# Patient Record
Sex: MALE | Race: BLACK OR AFRICAN AMERICAN | NOT HISPANIC OR LATINO | Employment: OTHER | URBAN - METROPOLITAN AREA
[De-identification: names, ages, dates, MRNs, and addresses within clinical notes are randomized per-mention and may not be internally consistent; named-entity substitution may affect disease eponyms.]

---

## 2018-10-26 ENCOUNTER — HOSPITAL ENCOUNTER (EMERGENCY)
Facility: OTHER | Age: 48
Discharge: HOME | End: 2018-10-26
Attending: EMERGENCY MEDICINE
Payer: MEDICARE

## 2018-10-26 VITALS
HEART RATE: 102 BPM | WEIGHT: 310 LBS | SYSTOLIC BLOOD PRESSURE: 140 MMHG | TEMPERATURE: 100 F | DIASTOLIC BLOOD PRESSURE: 90 MMHG | OXYGEN SATURATION: 98 % | BODY MASS INDEX: 39.78 KG/M2 | RESPIRATION RATE: 22 BRPM | HEIGHT: 74 IN

## 2018-10-26 DIAGNOSIS — N20.0 KIDNEY STONES: ICD-10-CM

## 2018-10-26 DIAGNOSIS — E27.8 ADRENAL NODULE: ICD-10-CM

## 2018-10-26 DIAGNOSIS — R80.9 PROTEINURIA, UNSPECIFIED TYPE: ICD-10-CM

## 2018-10-26 DIAGNOSIS — K76.0 FATTY LIVER: ICD-10-CM

## 2018-10-26 DIAGNOSIS — R11.2 NON-INTRACTABLE VOMITING WITH NAUSEA, UNSPECIFIED VOMITING TYPE: ICD-10-CM

## 2018-10-26 DIAGNOSIS — R73.9 HYPERGLYCEMIA: ICD-10-CM

## 2018-10-26 DIAGNOSIS — R10.84 GENERALIZED ABDOMINAL PAIN: Primary | ICD-10-CM

## 2018-10-26 DIAGNOSIS — R00.0 TACHYCARDIA: ICD-10-CM

## 2018-10-26 LAB
ALBUMIN SERPL BCP-MCNC: 4 G/DL
ALLENS TEST: ABNORMAL
ALP SERPL-CCNC: 103 U/L
ALT SERPL W/O P-5'-P-CCNC: 38 U/L
ANION GAP SERPL CALC-SCNC: 13 MMOL/L
AST SERPL-CCNC: 32 U/L
B-OH-BUTYR BLD STRIP-SCNC: 0.2 MMOL/L
BACTERIA #/AREA URNS HPF: 0 /HPF
BASOPHILS # BLD AUTO: 0.02 K/UL
BASOPHILS NFR BLD: 0.2 %
BILIRUB SERPL-MCNC: 0.6 MG/DL
BILIRUB UR QL STRIP: NEGATIVE
BUN SERPL-MCNC: 13 MG/DL
CALCIUM SERPL-MCNC: 10.2 MG/DL
CAOX CRY URNS QL MICRO: ABNORMAL
CHLORIDE SERPL-SCNC: 100 MMOL/L
CLARITY UR: CLEAR
CO2 SERPL-SCNC: 25 MMOL/L
COLOR UR: YELLOW
CREAT SERPL-MCNC: 1 MG/DL
DELSYS: ABNORMAL
DIFFERENTIAL METHOD: ABNORMAL
EOSINOPHIL # BLD AUTO: 0.1 K/UL
EOSINOPHIL NFR BLD: 0.5 %
ERYTHROCYTE [DISTWIDTH] IN BLOOD BY AUTOMATED COUNT: 13 %
ERYTHROCYTE [SEDIMENTATION RATE] IN BLOOD BY WESTERGREN METHOD: 18 MM/H
EST. GFR  (AFRICAN AMERICAN): >60 ML/MIN/1.73 M^2
EST. GFR  (NON AFRICAN AMERICAN): >60 ML/MIN/1.73 M^2
FIO2: 21
FLOW: 0
GLUCOSE SERPL-MCNC: 299 MG/DL
GLUCOSE UR QL STRIP: ABNORMAL
HCO3 UR-SCNC: 26.3 MMOL/L (ref 24–28)
HCT VFR BLD AUTO: 42.2 %
HGB BLD-MCNC: 14.2 G/DL
HGB UR QL STRIP: NEGATIVE
HYALINE CASTS #/AREA URNS LPF: 0 /LPF
INR PPP: 0.9
KETONES UR QL STRIP: NEGATIVE
LACTATE SERPL-SCNC: 1.9 MMOL/L
LEUKOCYTE ESTERASE UR QL STRIP: NEGATIVE
LIPASE SERPL-CCNC: 36 U/L
LYMPHOCYTES # BLD AUTO: 0.8 K/UL
LYMPHOCYTES NFR BLD: 6.1 %
MCH RBC QN AUTO: 27 PG
MCHC RBC AUTO-ENTMCNC: 33.6 G/DL
MCV RBC AUTO: 80 FL
MICROSCOPIC COMMENT: ABNORMAL
MODE: ABNORMAL
MONOCYTES # BLD AUTO: 0.5 K/UL
MONOCYTES NFR BLD: 4.2 %
NEUTROPHILS # BLD AUTO: 11 K/UL
NEUTROPHILS NFR BLD: 88.8 %
NITRITE UR QL STRIP: NEGATIVE
PCO2 BLDA: 44 MMHG (ref 35–45)
PH SMN: 7.38 [PH] (ref 7.35–7.45)
PH UR STRIP: 6 [PH] (ref 5–8)
PLATELET # BLD AUTO: 237 K/UL
PMV BLD AUTO: 12 FL
PO2 BLDA: 33 MMHG (ref 40–60)
POC BE: 1 MMOL/L
POC SATURATED O2: 62 % (ref 95–100)
POCT GLUCOSE: 288 MG/DL (ref 70–110)
POTASSIUM SERPL-SCNC: 4.1 MMOL/L
PROT SERPL-MCNC: 8.7 G/DL
PROT UR QL STRIP: ABNORMAL
PROTHROMBIN TIME: 10.2 SEC
RBC # BLD AUTO: 5.26 M/UL
RBC #/AREA URNS HPF: 0 /HPF (ref 0–4)
SAMPLE: ABNORMAL
SITE: ABNORMAL
SODIUM SERPL-SCNC: 138 MMOL/L
SP GR UR STRIP: 1.02 (ref 1–1.03)
SP02: 97
URN SPEC COLLECT METH UR: ABNORMAL
UROBILINOGEN UR STRIP-ACNC: NEGATIVE EU/DL
WBC # BLD AUTO: 12.43 K/UL
WBC #/AREA URNS HPF: 1 /HPF (ref 0–5)
YEAST URNS QL MICRO: ABNORMAL

## 2018-10-26 PROCEDURE — 99900035 HC TECH TIME PER 15 MIN (STAT)

## 2018-10-26 PROCEDURE — 81000 URINALYSIS NONAUTO W/SCOPE: CPT

## 2018-10-26 PROCEDURE — 83690 ASSAY OF LIPASE: CPT

## 2018-10-26 PROCEDURE — 96375 TX/PRO/DX INJ NEW DRUG ADDON: CPT

## 2018-10-26 PROCEDURE — 99285 EMERGENCY DEPT VISIT HI MDM: CPT | Mod: 25

## 2018-10-26 PROCEDURE — 96365 THER/PROPH/DIAG IV INF INIT: CPT | Mod: 59

## 2018-10-26 PROCEDURE — 82803 BLOOD GASES ANY COMBINATION: CPT

## 2018-10-26 PROCEDURE — 83605 ASSAY OF LACTIC ACID: CPT

## 2018-10-26 PROCEDURE — 93005 ELECTROCARDIOGRAM TRACING: CPT

## 2018-10-26 PROCEDURE — 85610 PROTHROMBIN TIME: CPT

## 2018-10-26 PROCEDURE — 82010 KETONE BODYS QUAN: CPT

## 2018-10-26 PROCEDURE — 93010 ELECTROCARDIOGRAM REPORT: CPT | Mod: ,,, | Performed by: INTERNAL MEDICINE

## 2018-10-26 PROCEDURE — 87040 BLOOD CULTURE FOR BACTERIA: CPT | Mod: 59

## 2018-10-26 PROCEDURE — 85025 COMPLETE CBC W/AUTO DIFF WBC: CPT

## 2018-10-26 PROCEDURE — 82962 GLUCOSE BLOOD TEST: CPT

## 2018-10-26 PROCEDURE — 96361 HYDRATE IV INFUSION ADD-ON: CPT

## 2018-10-26 PROCEDURE — 63600175 PHARM REV CODE 636 W HCPCS: Performed by: EMERGENCY MEDICINE

## 2018-10-26 PROCEDURE — 80053 COMPREHEN METABOLIC PANEL: CPT

## 2018-10-26 PROCEDURE — 25500020 PHARM REV CODE 255: Performed by: EMERGENCY MEDICINE

## 2018-10-26 PROCEDURE — 25000003 PHARM REV CODE 250: Performed by: EMERGENCY MEDICINE

## 2018-10-26 RX ORDER — MORPHINE SULFATE 2 MG/ML
2 INJECTION, SOLUTION INTRAMUSCULAR; INTRAVENOUS
Status: COMPLETED | OUTPATIENT
Start: 2018-10-26 | End: 2018-10-26

## 2018-10-26 RX ORDER — HYDROCHLOROTHIAZIDE 25 MG/1
25 TABLET ORAL DAILY
COMMUNITY
End: 2022-01-28 | Stop reason: SDUPTHER

## 2018-10-26 RX ORDER — ONDANSETRON 4 MG/1
4 TABLET, ORALLY DISINTEGRATING ORAL EVERY 8 HOURS PRN
Qty: 12 TABLET | Refills: 0 | Status: SHIPPED | OUTPATIENT
Start: 2018-10-26

## 2018-10-26 RX ORDER — LISINOPRIL 40 MG/1
40 TABLET ORAL DAILY
COMMUNITY
End: 2022-01-28 | Stop reason: SDUPTHER

## 2018-10-26 RX ORDER — ACETAMINOPHEN 500 MG
1000 TABLET ORAL
Status: COMPLETED | OUTPATIENT
Start: 2018-10-26 | End: 2018-10-26

## 2018-10-26 RX ORDER — OXYCODONE AND ACETAMINOPHEN 5; 325 MG/1; MG/1
1 TABLET ORAL EVERY 6 HOURS PRN
Qty: 8 TABLET | Refills: 0 | Status: SHIPPED | OUTPATIENT
Start: 2018-10-26 | End: 2022-11-04

## 2018-10-26 RX ORDER — METFORMIN HYDROCHLORIDE 500 MG/1
500 TABLET ORAL EVERY MORNING
COMMUNITY
End: 2022-11-04 | Stop reason: SDUPTHER

## 2018-10-26 RX ORDER — INSULIN GLARGINE 100 [IU]/ML
60 INJECTION, SOLUTION SUBCUTANEOUS NIGHTLY
COMMUNITY

## 2018-10-26 RX ORDER — ONDANSETRON 2 MG/ML
4 INJECTION INTRAMUSCULAR; INTRAVENOUS
Status: COMPLETED | OUTPATIENT
Start: 2018-10-26 | End: 2018-10-26

## 2018-10-26 RX ADMIN — SODIUM CHLORIDE 3000 ML: 0.9 INJECTION, SOLUTION INTRAVENOUS at 06:10

## 2018-10-26 RX ADMIN — IOHEXOL 100 ML: 350 INJECTION, SOLUTION INTRAVENOUS at 07:10

## 2018-10-26 RX ADMIN — ACETAMINOPHEN 1000 MG: 500 TABLET ORAL at 06:10

## 2018-10-26 RX ADMIN — PROMETHAZINE HYDROCHLORIDE 12.5 MG: 25 INJECTION INTRAMUSCULAR; INTRAVENOUS at 09:10

## 2018-10-26 RX ADMIN — INSULIN HUMAN 4 UNITS: 100 INJECTION, SOLUTION PARENTERAL at 06:10

## 2018-10-26 RX ADMIN — ONDANSETRON 4 MG: 2 INJECTION INTRAMUSCULAR; INTRAVENOUS at 06:10

## 2018-10-26 RX ADMIN — MORPHINE SULFATE 2 MG: 2 INJECTION, SOLUTION INTRAMUSCULAR; INTRAVENOUS at 08:10

## 2018-10-26 NOTE — ED NOTES
Patient Identifiers for Chet Rudd checked and correct  LOC: The patient is awake, alert and aware of environment with an appropriate affect, the patient is oriented x 3 and speaking appropriate.  APPEARANCE: Patient resting comfortably and in no acute distress, patient is clean and well groomed, patient's clothing is properly fastened.  SKIN: The skin is warm and dry, patient has normal skin turgor and moist mucus membranes,no rashes or lesions.Skin Intact , No Breakdown Noted  Musculoskeletal :  Normal range of motion noted. Moves all extremeties well, No swelling or tenderness noted  RESPIRATORY: Airway is open and patent, respirations are spontaneous, patient has a normal effort and rate.Bilateral Lungs Sounds are clear  CARDIAC: Patient has a normal rate and rhythm, no periphreal edema noted, capillary refill < 3 seconds.   ABDOMEN: Soft and slight epigastric tenderness to palpation, no distention noted. Bowels Sounds are +.  Abdomen with pannus  PULSES: 2+  And symmetrical in all extremeties  NEUROLOGIC: PERRL,  facial expression is symmetrical, patient moving all extremities, normal sensation in all extremities when touched with a finger.The patient is awake, alert and cooperative with a calm affect, patient is aware of environment.    Will continue to monitor

## 2018-10-26 NOTE — ED PROVIDER NOTES
Encounter Date: 10/26/2018    SCRIBE #1 NOTE: I, Elida Page, am scribing for, and in the presence of, Dr. Edmond.       History     Chief Complaint   Patient presents with    Emesis     pt c/o n/v and weakness since this morning. pt denies fever, chills, sob, cp      Time seen by provider: 5:58 PM    This is a 48 y.o. male who presents with complaint of vomiting today. He reports upper abdominal pain began prior to N/V. He had to leave school at 11:00 AM due to pain. He denies cough, diarrhea, or constipation. He is compliant with his medications. He reports history of HTN, DM, and pancreatitis. PSHx includes cholecystectomy. He denies use of tobacco, alcohol, or illicit drugs.      The history is provided by the patient.     Review of patient's allergies indicates:  No Known Allergies  Past Medical History:   Diagnosis Date    Diabetes mellitus     Hypertension      Past Surgical History:   Procedure Laterality Date    CHOLECYSTECTOMY       History reviewed. No pertinent family history.  Social History     Tobacco Use    Smoking status: Never Smoker   Substance Use Topics    Alcohol use: No     Frequency: Never    Drug use: No     Review of Systems   Constitutional: Negative for chills and fever.   HENT: Negative for congestion and sore throat.    Eyes: Negative for photophobia and redness.   Respiratory: Negative for cough and shortness of breath.    Cardiovascular: Negative for chest pain.   Gastrointestinal: Positive for abdominal pain, nausea and vomiting. Negative for blood in stool, constipation and diarrhea.   Genitourinary: Negative for dysuria.   Musculoskeletal: Negative for back pain.   Skin: Negative for rash.   Neurological: Negative for weakness, light-headedness and headaches.   Psychiatric/Behavioral: Negative for confusion.       Physical Exam     Initial Vitals [10/26/18 1732]   BP Pulse Resp Temp SpO2   (!) 192/108 (!) 120 18 (!) 101.8 °F (38.8 °C) 98 %      MAP       --          Physical Exam    Nursing note and vitals reviewed.  Constitutional: He appears well-developed and well-nourished. He is not diaphoretic. No distress.   HENT:   Head: Normocephalic and atraumatic.   Eyes: EOM are normal. No scleral icterus.   Neck: Normal range of motion. Neck supple.   Cardiovascular: Regular rhythm and normal heart sounds. Tachycardia present.  Exam reveals no gallop and no friction rub.    No murmur heard.  Pulmonary/Chest: Breath sounds normal. No respiratory distress. He has no wheezes. He has no rhonchi. He has no rales.   Abdominal: Soft. There is generalized tenderness. There is no rebound and no guarding.   Musculoskeletal: Normal range of motion. He exhibits no edema or tenderness.   Neurological: He is alert and oriented to person, place, and time.   Skin: Skin is warm and dry.         ED Course   Procedures  Labs Reviewed   CBC W/ AUTO DIFFERENTIAL - Abnormal; Notable for the following components:       Result Value    MCV 80 (*)     Gran # (ANC) 11.0 (*)     Lymph # 0.8 (*)     Gran% 88.8 (*)     Lymph% 6.1 (*)     All other components within normal limits   COMPREHENSIVE METABOLIC PANEL - Abnormal; Notable for the following components:    Glucose 299 (*)     Total Protein 8.7 (*)     All other components within normal limits   URINALYSIS, REFLEX TO URINE CULTURE - Abnormal; Notable for the following components:    Protein, UA 2+ (*)     Glucose, UA 1+ (*)     All other components within normal limits    Narrative:     Preferred Collection Type->Urine, Clean Catch   URINALYSIS MICROSCOPIC - Abnormal; Notable for the following components:    Yeast, UA Occasional (*)     All other components within normal limits    Narrative:     Preferred Collection Type->Urine, Clean Catch   POCT GLUCOSE - Abnormal; Notable for the following components:    POCT Glucose 288 (*)     All other components within normal limits   ISTAT PROCEDURE - Abnormal; Notable for the following components:    POC PO2  33 (*)     POC SATURATED O2 62 (*)     All other components within normal limits   CULTURE, BLOOD   CULTURE, BLOOD   LACTIC ACID, PLASMA   PROTIME-INR   BETA - HYDROXYBUTYRATE, SERUM   LIPASE     EKG Readings: (Independently Interpreted)   Sinus tachycardia at rate of 117 bpm.       Imaging Results          CT Abdomen Pelvis With Contrast (Final result)  Result time 10/26/18 20:06:39    Final result by Mando Castillo MD (10/26/18 20:06:39)                 Impression:      1. Hepatomegaly and hepatic steatosis.  2. Bilateral nonobstructing nephrolithiasis, greatest in burden on the left.  3. 2.1 cm right adrenal nodule suggestive of a lipid poor adenoma.  4. Urinary bladder circumferential wall thickening without adjacent inflammation which may be related to underdistention, with cystitis not excluded.  5. Right renal few scattered subcentimeter cortical foci which are too small to characterize.  6. Proximal colonic nonspecific liquid stool which could represent a diarrheal illness, without associated bowel obstruction or definite bowel inflammation.  7. Few additional findings as above.      Electronically signed by: Mando Castillo MD  Date:    10/26/2018  Time:    20:06             Narrative:    EXAMINATION:  CT ABDOMEN PELVIS WITH CONTRAST    CLINICAL HISTORY:  Abdominal Pain;    TECHNIQUE:  Low dose axial images, sagittal and coronal reformations were obtained from the lung bases to the pubic symphysis following the IV administration of 100 mL of Omnipaque 350 .  Oral contrast was not given.  Delayed images were obtained.    COMPARISON:  Chest radiograph same day abdominal ultrasound 07/05/2012    FINDINGS:  Included lung bases show mild dependent atelectasis without large consolidation.  Base of the heart is within normal limits.    Liver is enlarged with diffuse parenchymal hypoattenuation consistent with fatty infiltration.  Small nonspecific calcification at the lateral aspect of the inferior right hepatic  lobe.  Cholecystectomy.  Pancreas, spleen, and duodenum are within normal limits.  The stomach is moderately distended with intraluminal contents without wall thickening or adjacent inflammation.  There is a 2.1 cm low-density nodule at the right adrenal gland with average 55 Hounsfield units on the arterial phase and 23 Hounsfield units on the delayed phase suggestive of a lipid poor adenoma.  Nonspecific nodular thickening of the left adrenal gland.    Bilateral kidneys are normal in size, shape and location, concentrating and excreting contrast appropriately.  2 mm nephrolith at the right renal upper pole.  At least 3 small nephroliths at the left renal lower pole with the largest measuring 4 mm.  Bilateral ureters are within normal limits.  No hydronephrosis or significant perinephric stranding.  A few scattered subcentimeter cortical foci at the right kidney which are too small to characterize.  Urinary bladder is suboptimally distended with circumferential wall thickening but no adjacent inflammation.  Prostate and seminal vesicles are within normal limits.  Pelvic phleboliths noted.    No ascites, free air or lymphadenopathy.  Aorta is within normal limits.    Tiny fat containing umbilical hernia.  Appendix and terminal ileum are within normal limits.  Liquid stool seen within the cecum and ascending colon.  No dilated loops of bowel or convincing evidence of bowel inflammation.  No pneumatosis or portal venous gas.    Included osseous structures show degenerative change most prominent at L5-S1, without acute displaced fracture-dislocation identified.                               X-Ray Chest AP Portable (Final result)  Result time 10/26/18 18:25:24    Final result by Mando Castillo MD (10/26/18 18:25:24)                 Impression:      No radiographic acute intrathoracic process seen.  Specifically, no focal consolidation.      Electronically signed by: Mando Castillo MD  Date:    10/26/2018  Time:    18:25              Narrative:    EXAMINATION:  XR CHEST AP PORTABLE    CLINICAL HISTORY:  Sepsis;    TECHNIQUE:  AP portable view of the chest was performed.    COMPARISON:  Chest radiograph 07/05/2012    FINDINGS:  Monitoring leads overlie the chest.  Patient is rotated.  Large body habitus.    Cardiomediastinal silhouette is within normal limits for age without convincing evidence of failure.  Pulmonary vasculature and hilar regions are within normal limits.  The lungs are symmetrically well expanded without large consolidation, pleural effusion or pneumothorax.  No acute osseous process seen.  PA and lateral views can be obtained.                              X-Rays:   Independently Interpreted Readings:   Chest X-Ray: No acute findings.     Medical Decision Making:   Clinical Tests:   Lab Tests: Ordered and Reviewed  Radiological Study: Ordered and Reviewed  Medical Tests: Ordered and Reviewed            Scribe Attestation:   Scribe #1: I performed the above scribed service and the documentation accurately describes the services I performed. I attest to the accuracy of the note.    Attending Attestation:           Physician Attestation for Scribe:  Physician Attestation Statement for Scribe #1: I, Dr. Edmond, reviewed documentation, as scribed by Elida Page in my presence, and it is both accurate and complete.         Attending ED Notes:   Emergent evaluation a 48-year-old male with abdominal pain, nausea and vomiting. Patient is febrile, nontoxic appearing with stable vital signs except for elevation in heart rate.  Urinary analysis reveals protein, occasional yeast and sugar with, oxalate crystals.  Patient has no elevation of white blood cell count.  H&H within normal limits. Blood glucose on CMP is 299.  No clinical or diagnostic evidence of DKA.  Lactic acid is 1.9.  Beta hydroxybutyrate is 0.2.  Lipase is 36.  CT scan reveals hepatic steatosis, bilateral nonobstructing nephrolithiasis, adrenal nodule and  liquid stool in the colon.  The patient is extensively counseled on his diagnosis and treatment including all diagnostic, laboratory and physical exam findings.  The patient discharged good condition, tolerating p.o. intake without complication and directed follow-up with his PCP in the next 24-48 hours.             Clinical Impression:     1. Generalized abdominal pain    2. Tachycardia    3. Non-intractable vomiting with nausea, unspecified vomiting type    4. Hyperglycemia    5. Proteinuria, unspecified type    6. Fatty liver    7. Kidney stones    8. Adrenal nodule                                 Obed Urrutia MD  10/26/18 1640

## 2018-10-27 NOTE — ED NOTES
Pt resting w/ eyes closed and awakens easily. Pt states the nausea medication made him sleepy. Pt denies pain and distress. No respiratory distress observed. Respirations are even and unlabored. Will continue to monitor closely.

## 2018-10-27 NOTE — ED NOTES
Pt resting w/ eyes closed and in no distress. Pt arouses easily and denies N/V/D. No respiratory distress observed. Will continue to monitor closely.

## 2018-10-27 NOTE — ED NOTES
Rec'd report from CAROL Leone RN. Pt is A & O x 3, denies SOB, fever,chills and N/V/D at this time. Respiraitons are even and unlabored. Skin is warm and dry w/ pink mucosa. VS. Pt is connected to the pulse ox, B/P cuff and EKG monitor. Bed is locked and in the low position w/ the side rails up and locked for pt safety. Call bell @ the BS. Will continue to monitor closely.

## 2018-10-31 LAB
BACTERIA BLD CULT: NORMAL
BACTERIA BLD CULT: NORMAL

## 2019-03-13 ENCOUNTER — HOSPITAL ENCOUNTER (EMERGENCY)
Facility: OTHER | Age: 49
Discharge: HOME | End: 2019-03-13
Attending: EMERGENCY MEDICINE
Payer: COMMERCIAL

## 2019-03-13 VITALS
RESPIRATION RATE: 20 BRPM | TEMPERATURE: 98 F | HEART RATE: 104 BPM | HEIGHT: 74 IN | BODY MASS INDEX: 37.47 KG/M2 | OXYGEN SATURATION: 98 % | SYSTOLIC BLOOD PRESSURE: 152 MMHG | WEIGHT: 292 LBS | DIASTOLIC BLOOD PRESSURE: 71 MMHG

## 2019-03-13 DIAGNOSIS — I10 ESSENTIAL HYPERTENSION: ICD-10-CM

## 2019-03-13 DIAGNOSIS — H11.31 SUBCONJUNCTIVAL HEMORRHAGE OF RIGHT EYE: Primary | ICD-10-CM

## 2019-03-13 PROCEDURE — 25000003 PHARM REV CODE 250: Performed by: EMERGENCY MEDICINE

## 2019-03-13 PROCEDURE — 99283 EMERGENCY DEPT VISIT LOW MDM: CPT

## 2019-03-13 RX ORDER — PROPARACAINE HYDROCHLORIDE 5 MG/ML
1 SOLUTION/ DROPS OPHTHALMIC
Status: COMPLETED | OUTPATIENT
Start: 2019-03-13 | End: 2019-03-13

## 2019-03-13 RX ADMIN — PROPARACAINE HYDROCHLORIDE 1 DROP: 5 SOLUTION/ DROPS OPHTHALMIC at 04:03

## 2019-03-13 RX ADMIN — FLUORESCEIN SODIUM 1 EACH: 1 STRIP OPHTHALMIC at 04:03

## 2019-03-13 NOTE — DISCHARGE INSTRUCTIONS
You need to have the pressure of your eyes checked as soon as possible by an eye doctor. If you vision worsens acutely, pain worsens or any other concerns you can always return to the ED if not able to be seen by an opthalmology

## 2019-03-13 NOTE — ED PROVIDER NOTES
"Encounter Date: 3/13/2019    SCRIBE #1 NOTE: I, Brina Bear, am scribing for, and in the presence of, Dr. Smith.       History     Chief Complaint   Patient presents with    Eye Problem     reports redness to right eye x3 days pta      Time seen by provider: 4:08 PM    This is a 49 Y.O. male, with a history of DM and HTN, who presents with complaint of eye redness that began three days ago. Patient reports right eye redness that has been progressively worsening. He states right eye "just feels funny." He denies trauma. He reports being near sighted. He reports chronic cough. He denies vision changes, eye pain, eye itching, eye discharge, vomiting, or headache. He denies using any treatments or medications prior to arrival. He does not have opthalmologic care established.       The history is provided by the patient.     Review of patient's allergies indicates:  No Known Allergies  Past Medical History:   Diagnosis Date    Diabetes mellitus     Hypertension      Past Surgical History:   Procedure Laterality Date    CHOLECYSTECTOMY       History reviewed. No pertinent family history.  Social History     Tobacco Use    Smoking status: Never Smoker   Substance Use Topics    Alcohol use: No     Frequency: Never    Drug use: No     Review of Systems   Constitutional: Negative for chills and fever.   HENT: Negative for sore throat.    Eyes: Positive for redness. Negative for pain, discharge, itching and visual disturbance.   Respiratory: Positive for cough. Negative for shortness of breath.    Cardiovascular: Negative for chest pain.   Gastrointestinal: Negative for abdominal pain, nausea and vomiting.   Genitourinary: Negative for dysuria.   Musculoskeletal: Negative for back pain.   Skin: Negative for rash.   Neurological: Negative for weakness and headaches.       Physical Exam     Initial Vitals [03/13/19 1530]   BP Pulse Resp Temp SpO2   (!) 151/87 110 20 98.7 °F (37.1 °C) 98 %      MAP       --     " "    Physical Exam    Nursing note and vitals reviewed.  Constitutional: He appears well-developed and well-nourished. No distress.   HENT:   Head: Normocephalic and atraumatic.   Eyes: EOM and lids are normal. Pupils are equal, round, and reactive to light. Lids are everted and swept, no foreign bodies found. Right eye exhibits no chemosis. No foreign body present in the right eye. Left eye exhibits no chemosis. No foreign body present in the left eye. Right conjunctiva has a hemorrhage.   Slit lamp exam:       The right eye shows no corneal abrasion and no fluorescein uptake.   Neck: Normal range of motion. Neck supple. No thyromegaly present. No JVD present.   Cardiovascular: Normal rate, regular rhythm and normal heart sounds.   Pulmonary/Chest: Breath sounds normal. No respiratory distress.   Abdominal: Soft. Bowel sounds are normal. He exhibits no distension and no mass. There is no tenderness.   Musculoskeletal: Normal range of motion.   Neurological: He is alert and oriented to person, place, and time. He has normal strength. No cranial nerve deficit or sensory deficit.   Skin: Skin is warm and dry. No rash noted.   Psychiatric: He has a normal mood and affect. His behavior is normal. Judgment and thought content normal.         ED Course   Procedures  Labs Reviewed - No data to display       Imaging Results    None          Medical Decision Making:   Initial Assessment:   Urgent evaluation of 49-year-old gentleman with diabetes and hypertension here with complaint of discoloration to the right eye of the last 3 days.  Patient denies known injury, foreign body sensation, reports "it just feels funny", but denies acute vision changes, or headache.  Exam w subconjunctival hem to medial sclera on R, preserved EOMI, no fluorescein uptake, IOP mildly elevated on R- 22, 27, 19, L 16, 19, 21, VA 20/70 on R, 20 /50 on L reportedly at baseline per pt  Discussed supportive care, and contact made with Optho to establish " clinic visit on 3/22, if develops vision changes, increased pain will return to ED.             Scribe Attestation:   Scribe #1: I performed the above scribed service and the documentation accurately describes the services I performed. I attest to the accuracy of the note.    Attending Attestation:           Physician Attestation for Scribe:  Physician Attestation Statement for Scribe #1: I, Dr. Smith, reviewed documentation, as scribed by Brina Bear in my presence, and it is both accurate and complete.                    Clinical Impression:     1. Subconjunctival hemorrhage of right eye    2. Essential hypertension            Disposition:   Disposition: Discharged  Condition: Mauricio Smith MD  03/13/19 1700

## 2019-03-13 NOTE — ED TRIAGE NOTES
"Pt presents to ED with c/o redness to right eye x 3 days. Pt reports 4/10 pain to right eye. Bright red discoloration noted to medial sclera. Pt denies wearing contacts. Pt reports "a little" change in vision. Denies photophobia, double vision.  "

## 2019-03-14 ENCOUNTER — TELEPHONE (OUTPATIENT)
Dept: OPHTHALMOLOGY | Facility: CLINIC | Age: 49
End: 2019-03-14

## 2019-06-24 ENCOUNTER — HOSPITAL ENCOUNTER (EMERGENCY)
Facility: OTHER | Age: 49
Discharge: HOME | End: 2019-06-24
Attending: EMERGENCY MEDICINE
Payer: COMMERCIAL

## 2019-06-24 VITALS
HEIGHT: 74 IN | BODY MASS INDEX: 37.86 KG/M2 | HEART RATE: 98 BPM | DIASTOLIC BLOOD PRESSURE: 97 MMHG | SYSTOLIC BLOOD PRESSURE: 142 MMHG | WEIGHT: 295 LBS | RESPIRATION RATE: 18 BRPM | TEMPERATURE: 98 F | OXYGEN SATURATION: 99 %

## 2019-06-24 DIAGNOSIS — J02.9 ACUTE PHARYNGITIS, UNSPECIFIED ETIOLOGY: Primary | ICD-10-CM

## 2019-06-24 LAB
DEPRECATED S PYO AG THROAT QL EIA: NEGATIVE
DEPRECATED S PYO AG THROAT QL EIA: NEGATIVE

## 2019-06-24 PROCEDURE — 25000003 PHARM REV CODE 250: Performed by: PHYSICIAN ASSISTANT

## 2019-06-24 PROCEDURE — 87147 CULTURE TYPE IMMUNOLOGIC: CPT

## 2019-06-24 PROCEDURE — 63600175 PHARM REV CODE 636 W HCPCS: Performed by: PHYSICIAN ASSISTANT

## 2019-06-24 PROCEDURE — 82962 GLUCOSE BLOOD TEST: CPT

## 2019-06-24 PROCEDURE — 87880 STREP A ASSAY W/OPTIC: CPT | Mod: 59

## 2019-06-24 PROCEDURE — 87081 CULTURE SCREEN ONLY: CPT

## 2019-06-24 PROCEDURE — 99284 EMERGENCY DEPT VISIT MOD MDM: CPT | Mod: 25

## 2019-06-24 RX ORDER — CLINDAMYCIN HYDROCHLORIDE 150 MG/1
450 CAPSULE ORAL EVERY 6 HOURS
Qty: 120 CAPSULE | Refills: 0 | Status: SHIPPED | OUTPATIENT
Start: 2019-06-24 | End: 2019-07-04

## 2019-06-24 RX ORDER — IBUPROFEN 600 MG/1
600 TABLET ORAL EVERY 6 HOURS PRN
Qty: 20 TABLET | Refills: 0 | Status: SHIPPED | OUTPATIENT
Start: 2019-06-24

## 2019-06-24 RX ORDER — KETOROLAC TROMETHAMINE 30 MG/ML
30 INJECTION, SOLUTION INTRAMUSCULAR; INTRAVENOUS
Status: COMPLETED | OUTPATIENT
Start: 2019-06-24 | End: 2019-06-24

## 2019-06-24 RX ORDER — ACETAMINOPHEN 650 MG/20.3ML
1000 LIQUID ORAL
Status: COMPLETED | OUTPATIENT
Start: 2019-06-24 | End: 2019-06-24

## 2019-06-24 RX ORDER — CLINDAMYCIN HYDROCHLORIDE 150 MG/1
450 CAPSULE ORAL
Status: COMPLETED | OUTPATIENT
Start: 2019-06-24 | End: 2019-06-24

## 2019-06-24 RX ORDER — DEXAMETHASONE SODIUM PHOSPHATE 4 MG/ML
8 INJECTION, SOLUTION INTRA-ARTICULAR; INTRALESIONAL; INTRAMUSCULAR; INTRAVENOUS; SOFT TISSUE
Status: COMPLETED | OUTPATIENT
Start: 2019-06-24 | End: 2019-06-24

## 2019-06-24 RX ADMIN — CLINDAMYCIN HYDROCHLORIDE 450 MG: 150 CAPSULE ORAL at 02:06

## 2019-06-24 RX ADMIN — ACETAMINOPHEN 999.01 MG: 160 SOLUTION ORAL at 01:06

## 2019-06-24 RX ADMIN — KETOROLAC TROMETHAMINE 30 MG: 30 INJECTION, SOLUTION INTRAMUSCULAR; INTRAVENOUS at 01:06

## 2019-06-24 RX ADMIN — DEXAMETHASONE SODIUM PHOSPHATE 8 MG: 4 INJECTION, SOLUTION INTRAMUSCULAR; INTRAVENOUS at 01:06

## 2019-06-24 NOTE — ED TRIAGE NOTES
"Pt arrived with c/o sore throat x 3 days.  Pt states, "It hurts to swallow and my throat feels swollen."  Pt denies any difficultly breathing.  Reports his voice is hoarse.  Reports R ear pain.  Denies fever.  Respirations even and unlabored.  "

## 2019-06-24 NOTE — ED PROVIDER NOTES
Encounter Date: 6/24/2019       History     Chief Complaint   Patient presents with    Sore Throat     Pt sore throat for the past three days.      Patient is 49-year-old male history of hypertension diabetes who presents with complaints of sore throat that has been present for 2 days prior to arrival.  He reports throat is painful when swallowing eating drinking and speaking.  He is able to swallow solid foods.  He is able to swallow saliva.  He has no difficulty breathing.  He reports right side is more painful than the left.  He denies fevers, chills, nausea or vomiting.  He has no associated nasal congestion, coughing, chest pain the or shortness of breath.  He has not been taking any medications to help with the symptoms but he does report that he has been gargling with salt water and drinking warm tea without any relief.  He has no known allergies and is currently unaccompanied in the ER.        Review of patient's allergies indicates:  No Known Allergies  Past Medical History:   Diagnosis Date    Diabetes mellitus     Hypertension      Past Surgical History:   Procedure Laterality Date    CHOLECYSTECTOMY       History reviewed. No pertinent family history.  Social History     Tobacco Use    Smoking status: Current Every Day Smoker     Packs/day: 0.50     Types: Cigarettes   Substance Use Topics    Alcohol use: No     Frequency: Never    Drug use: No     Review of Systems   Constitutional: Negative for fever.   HENT: Positive for sore throat.    Respiratory: Negative for shortness of breath.    Cardiovascular: Negative for chest pain.   Gastrointestinal: Negative for nausea.   Genitourinary: Negative for dysuria.   Musculoskeletal: Negative for back pain.   Skin: Negative for rash.   Neurological: Negative for weakness.   Hematological: Does not bruise/bleed easily.       Physical Exam     Initial Vitals [06/24/19 1239]   BP Pulse Resp Temp SpO2   (!) 171/99 109 18 99.2 °F (37.3 °C) 100 %      MAP        --         Physical Exam    Nursing note and vitals reviewed.  Constitutional: He appears well-developed and well-nourished. He is not diaphoretic. No distress.   Healthy-appearing male in no acute distress or apparent pain. Makes good eye contact, speaks in clear full sentences though has hot potato voice.  Manages oral secretions with ease.  Is cooperative and ambulates on his own.   HENT:   Head: Normocephalic and atraumatic.   Posterior oropharynx is erythematous edematous with exudate on bilateral tonsils no obvious uvula deviation or asymmetry.   Eyes: Conjunctivae and EOM are normal. Pupils are equal, round, and reactive to light. Right eye exhibits no discharge. Left eye exhibits no discharge. No scleral icterus.   Neck: Normal range of motion.   Cardiovascular: Normal rate, regular rhythm, normal heart sounds and intact distal pulses. Exam reveals no gallop and no friction rub.    No murmur heard.  Pulmonary/Chest: Breath sounds normal. He has no wheezes. He has no rhonchi. He has no rales.   Clear lungs auscultation bilaterally   Abdominal: Soft. Bowel sounds are normal. There is no tenderness. There is no rebound and no guarding.   Musculoskeletal: Normal range of motion. He exhibits no edema or tenderness.   Lymphadenopathy:     He has no cervical adenopathy.   Neurological: He is alert and oriented to person, place, and time. He has normal strength. No cranial nerve deficit or sensory deficit. GCS score is 15. GCS eye subscore is 4. GCS verbal subscore is 5. GCS motor subscore is 6.   Skin: Skin is warm. Capillary refill takes less than 2 seconds. No rash and no abscess noted. No erythema.   Psychiatric: He has a normal mood and affect. His behavior is normal. Thought content normal.         ED Course   Procedures  Labs Reviewed   THROAT SCREEN, RAPID          Imaging Results    None          Medical Decision Making:   ED Management:  Urgent evaluation a 49-year-old male who presents with complaints  most consistent with acute pharyngitis etiology unclear at this time.  He is afebrile, nontoxic appearing, hemodynamically stable though has low-grade temp 99.2° with heart rate of 109.  Suspect he could be spiking a fever.  Will obtain rapid strep and give Toradol Decadron Tylenol for symptom management.  Depending on culture results will either treat with Bicillin or clindamycin.  Patient is amenable to plan.  Will continue to follow.    Update: Patient feeling much better, HR still slightly elevated, will give 1L water to drink for oral hydration. He has no clinical evidence of PTA at  This time but because of his swelling and report of right sided pain greater than left with cover with PO clindamycin out of an abundance of caution. Rapid strep is negative. He is educated very carefully on ED return precautions and verbalizes understanding. He is stable for discharge.                        Clinical Impression:       ICD-10-CM ICD-9-CM   1. Acute pharyngitis, unspecified etiology J02.9 462                                Shavonne Bear PA-C  06/24/19 0422

## 2019-06-26 LAB — BACTERIA THROAT CULT: NORMAL

## 2020-10-28 ENCOUNTER — HOSPITAL ENCOUNTER (EMERGENCY)
Facility: OTHER | Age: 50
Discharge: HOME | End: 2020-10-28
Attending: EMERGENCY MEDICINE
Payer: MEDICARE

## 2020-10-28 VITALS
WEIGHT: 280 LBS | SYSTOLIC BLOOD PRESSURE: 140 MMHG | TEMPERATURE: 99 F | DIASTOLIC BLOOD PRESSURE: 91 MMHG | RESPIRATION RATE: 18 BRPM | OXYGEN SATURATION: 95 % | HEIGHT: 74 IN | BODY MASS INDEX: 35.94 KG/M2 | HEART RATE: 96 BPM

## 2020-10-28 DIAGNOSIS — R11.2 NAUSEA AND VOMITING IN ADULT: ICD-10-CM

## 2020-10-28 DIAGNOSIS — R10.84 GENERALIZED ABDOMINAL PAIN: Primary | ICD-10-CM

## 2020-10-28 LAB
ALBUMIN SERPL BCP-MCNC: 4.3 G/DL (ref 3.5–5.2)
ALP SERPL-CCNC: 99 U/L (ref 55–135)
ALT SERPL W/O P-5'-P-CCNC: 26 U/L (ref 10–44)
ANION GAP SERPL CALC-SCNC: 13 MMOL/L (ref 8–16)
AST SERPL-CCNC: 16 U/L (ref 10–40)
BASOPHILS # BLD AUTO: 0.07 K/UL (ref 0–0.2)
BASOPHILS NFR BLD: 0.5 % (ref 0–1.9)
BILIRUB SERPL-MCNC: 0.5 MG/DL (ref 0.1–1)
BILIRUB UR QL STRIP: NEGATIVE
BUN SERPL-MCNC: 14 MG/DL (ref 6–20)
CALCIUM SERPL-MCNC: 10.1 MG/DL (ref 8.7–10.5)
CHLORIDE SERPL-SCNC: 97 MMOL/L (ref 95–110)
CLARITY UR: CLEAR
CO2 SERPL-SCNC: 27 MMOL/L (ref 23–29)
COLOR UR: YELLOW
CREAT SERPL-MCNC: 1.2 MG/DL (ref 0.5–1.4)
DIFFERENTIAL METHOD: ABNORMAL
EOSINOPHIL # BLD AUTO: 0.1 K/UL (ref 0–0.5)
EOSINOPHIL NFR BLD: 0.3 % (ref 0–8)
ERYTHROCYTE [DISTWIDTH] IN BLOOD BY AUTOMATED COUNT: 12.7 % (ref 11.5–14.5)
EST. GFR  (AFRICAN AMERICAN): >60 ML/MIN/1.73 M^2
EST. GFR  (NON AFRICAN AMERICAN): >60 ML/MIN/1.73 M^2
GLUCOSE SERPL-MCNC: 265 MG/DL (ref 70–110)
GLUCOSE UR QL STRIP: ABNORMAL
HCT VFR BLD AUTO: 42.9 % (ref 40–54)
HGB BLD-MCNC: 14 G/DL (ref 14–18)
HGB UR QL STRIP: NEGATIVE
IMM GRANULOCYTES # BLD AUTO: 0.04 K/UL (ref 0–0.04)
IMM GRANULOCYTES NFR BLD AUTO: 0.3 % (ref 0–0.5)
KETONES UR QL STRIP: NEGATIVE
LEUKOCYTE ESTERASE UR QL STRIP: NEGATIVE
LIPASE SERPL-CCNC: 46 U/L (ref 4–60)
LYMPHOCYTES # BLD AUTO: 1.9 K/UL (ref 1–4.8)
LYMPHOCYTES NFR BLD: 12.7 % (ref 18–48)
MCH RBC QN AUTO: 26.7 PG (ref 27–31)
MCHC RBC AUTO-ENTMCNC: 32.6 G/DL (ref 32–36)
MCV RBC AUTO: 82 FL (ref 82–98)
MONOCYTES # BLD AUTO: 0.6 K/UL (ref 0.3–1)
MONOCYTES NFR BLD: 4 % (ref 4–15)
NEUTROPHILS # BLD AUTO: 12.4 K/UL (ref 1.8–7.7)
NEUTROPHILS NFR BLD: 82.2 % (ref 38–73)
NITRITE UR QL STRIP: NEGATIVE
NRBC BLD-RTO: 0 /100 WBC
PH UR STRIP: 5 [PH] (ref 5–8)
PLATELET # BLD AUTO: 260 K/UL (ref 150–350)
PMV BLD AUTO: 11.2 FL (ref 9.2–12.9)
POCT GLUCOSE: 326 MG/DL (ref 70–110)
POTASSIUM SERPL-SCNC: 3.9 MMOL/L (ref 3.5–5.1)
PROT SERPL-MCNC: 8.9 G/DL (ref 6–8.4)
PROT UR QL STRIP: ABNORMAL
RBC # BLD AUTO: 5.25 M/UL (ref 4.6–6.2)
SODIUM SERPL-SCNC: 137 MMOL/L (ref 136–145)
SP GR UR STRIP: >=1.03 (ref 1–1.03)
URN SPEC COLLECT METH UR: ABNORMAL
UROBILINOGEN UR STRIP-ACNC: NEGATIVE EU/DL
WBC # BLD AUTO: 15.07 K/UL (ref 3.9–12.7)

## 2020-10-28 PROCEDURE — 80053 COMPREHEN METABOLIC PANEL: CPT

## 2020-10-28 PROCEDURE — 85025 COMPLETE CBC W/AUTO DIFF WBC: CPT

## 2020-10-28 PROCEDURE — 99283 EMERGENCY DEPT VISIT LOW MDM: CPT | Mod: 25

## 2020-10-28 PROCEDURE — 81003 URINALYSIS AUTO W/O SCOPE: CPT

## 2020-10-28 PROCEDURE — 82962 GLUCOSE BLOOD TEST: CPT

## 2020-10-28 PROCEDURE — 83690 ASSAY OF LIPASE: CPT

## 2020-10-28 PROCEDURE — 25000003 PHARM REV CODE 250: Performed by: EMERGENCY MEDICINE

## 2020-10-28 RX ORDER — ONDANSETRON 8 MG/1
8 TABLET, ORALLY DISINTEGRATING ORAL
Status: COMPLETED | OUTPATIENT
Start: 2020-10-28 | End: 2020-10-28

## 2020-10-28 RX ORDER — FAMOTIDINE 20 MG/1
20 TABLET, FILM COATED ORAL
Status: COMPLETED | OUTPATIENT
Start: 2020-10-28 | End: 2020-10-28

## 2020-10-28 RX ORDER — ONDANSETRON 8 MG/1
8 TABLET, ORALLY DISINTEGRATING ORAL EVERY 6 HOURS PRN
Qty: 15 TABLET | Refills: 0 | Status: SHIPPED | OUTPATIENT
Start: 2020-10-28

## 2020-10-28 RX ADMIN — FAMOTIDINE 20 MG: 20 TABLET ORAL at 06:10

## 2020-10-28 RX ADMIN — ONDANSETRON 8 MG: 8 TABLET, ORALLY DISINTEGRATING ORAL at 04:10

## 2020-10-28 NOTE — ED PROVIDER NOTES
"Encounter Date: 10/28/2020    SCRIBE #1 NOTE: IBre, am scribing for, and in the presence of, Dr. Frank.       History     Chief Complaint   Patient presents with    Abdominal Pain     generalized left sided x1 week- states "can't keep anything down". Pt diaphoretic     Time seen by provider: 3:45 AM    This is a 50 y.o. male, with a hx of diabetes and HTN, who presents with complaint of generalized abdominal pain for the last week. Pt states the pain has gotten progressively worse. He states he has been unable to tolerate any liquids or solids. Pt reports body aches, chills, and dysuria. He denies hematuria or fever.    The history is provided by the patient and medical records.     Review of patient's allergies indicates:  No Known Allergies  Past Medical History:   Diagnosis Date    Diabetes mellitus     Hypertension      Past Surgical History:   Procedure Laterality Date    CHOLECYSTECTOMY       History reviewed. No pertinent family history.  Social History     Tobacco Use    Smoking status: Current Every Day Smoker     Packs/day: 0.50     Types: Cigarettes   Substance Use Topics    Alcohol use: No     Frequency: Never    Drug use: Yes     Comment: opoids     Review of Systems   Constitutional: Positive for chills. Negative for fever.   HENT: Negative for congestion, rhinorrhea and sore throat.    Eyes: Negative for visual disturbance.   Respiratory: Negative for cough and shortness of breath.    Cardiovascular: Negative for chest pain.   Gastrointestinal: Positive for abdominal pain, nausea and vomiting. Negative for diarrhea.   Genitourinary: Positive for dysuria. Negative for hematuria.   Musculoskeletal: Positive for myalgias. Negative for back pain.   Skin: Negative for rash.   Neurological: Negative for dizziness, weakness and light-headedness.   Psychiatric/Behavioral: Negative for confusion.       Physical Exam     Initial Vitals [10/28/20 0051]   BP Pulse Resp Temp SpO2   (!) 138/93 " 101 18 98.7 °F (37.1 °C) 99 %      MAP       --         Physical Exam    Nursing note and vitals reviewed.  Constitutional: He appears well-developed and well-nourished. He is not diaphoretic. No distress.   HENT:   Head: Normocephalic and atraumatic.   Eyes: Conjunctivae and EOM are normal. Pupils are equal, round, and reactive to light. No scleral icterus.   Neck: Normal range of motion. Neck supple.   Cardiovascular: Normal rate, regular rhythm and normal heart sounds. Exam reveals no gallop and no friction rub.    No murmur heard.  Pulmonary/Chest: Breath sounds normal. No respiratory distress. He has no wheezes. He has no rhonchi. He has no rales.   Abdominal: Soft. Bowel sounds are normal. He exhibits no distension. There is generalized abdominal tenderness. There is no rebound and no guarding.   Musculoskeletal: Normal range of motion. No tenderness or edema.   Neurological: He is alert and oriented to person, place, and time.   Skin: Skin is warm and dry.         ED Course   Procedures  Labs Reviewed   CBC W/ AUTO DIFFERENTIAL - Abnormal; Notable for the following components:       Result Value    WBC 15.07 (*)     MCH 26.7 (*)     Gran # (ANC) 12.4 (*)     Gran % 82.2 (*)     Lymph % 12.7 (*)     All other components within normal limits   COMPREHENSIVE METABOLIC PANEL - Abnormal; Notable for the following components:    Glucose 265 (*)     Total Protein 8.9 (*)     All other components within normal limits   URINALYSIS, REFLEX TO URINE CULTURE - Abnormal; Notable for the following components:    Specific Gravity, UA >=1.030 (*)     Protein, UA Trace (*)     Glucose, UA 2+ (*)     All other components within normal limits    Narrative:     Specimen Source->Urine   POCT GLUCOSE - Abnormal; Notable for the following components:    POCT Glucose 326 (*)     All other components within normal limits   LIPASE          Imaging Results    None          Medical Decision Making:   History:   Old Medical Records: I  decided to obtain old medical records.  Clinical Tests:   Lab Tests: Ordered and Reviewed            Scribe Attestation:   Scribe #1: I performed the above scribed service and the documentation accurately describes the services I performed. I attest to the accuracy of the note.    Attending Attestation:           Physician Attestation for Scribe:  Physician Attestation Statement for Scribe #1: I, Dr. Frank, reviewed documentation, as scribed by Bre Andres in my presence, and it is both accurate and complete.                 ED Course as of Oct 28 2207   Wed Oct 28, 2020   0548 5:48 AM  Patient presented complaining of generalized abdominal pain with nausea and vomiting for 3-4 days.  On my assessment he had diffuse abdominal tenderness to palpation.  Despite reporting not being evaluated for the same complaint previously, I did find he had an ED visit 2 days ago for the same symptoms.  At that time he had a workup which included blood work and urinalysis.  Labs were without significant abnormalities except for an elevated blood glucose.  I did repeat labs today which again were significant for an elevated blood glucose.  He also had a leukocytosis with a slight left shift.  My reassessment the patient is sleeping comfortably.  He has required no pain medication and only received Zofran ODT.  Etiology of the leukocytosis is unclear, however, I do not feel a CT is necessary at this time.  Patient was given precautions for seeking immediate re-evaluation and will be discharged home with a prescription for Zofran and instructions to follow up with primary care the next 72 hr.    [AA]      ED Course User Index  [AA] Melina Frank MD            Clinical Impression:     ICD-10-CM ICD-9-CM   1. Generalized abdominal pain  R10.84 789.07   2. Nausea and vomiting in adult  R11.2 787.01                          ED Disposition Condition    Discharge Stable        ED Prescriptions     Medication Sig Dispense Start Date End  Date Auth. Provider    ondansetron (ZOFRAN-ODT) 8 MG TbDL Take 1 tablet (8 mg total) by mouth every 6 (six) hours as needed (Nausea). 15 tablet 10/28/2020  Melina Frank MD        Follow-up Information     Follow up With Specialties Details Why Contact Info    [facility] Emergency Medicine Go to  If symptoms worsen [address]  [facility] [id number] [phone]    Your primary care doctor  Schedule an appointment as soon as possible for a visit on 10/30/2020 For re-evaluation                                        Melina Frank MD  10/28/20 6393

## 2020-10-28 NOTE — ED TRIAGE NOTES
"Pt reports to ED with c/o generalized, L-sided, intermittent ABD pain x5 days. Pt reports N,V,D. Pt states he cannot has no appetite and cannot keep anything down. Pt diaphoretic. Pt describes pain as "pulling." Pt denies taking medication at home. Pt has hx of diabetes and HTN and reports medication compliance.    "

## 2022-01-28 ENCOUNTER — HOSPITAL ENCOUNTER (EMERGENCY)
Facility: HOSPITAL | Age: 52
Discharge: HOME | End: 2022-01-28
Attending: EMERGENCY MEDICINE
Payer: MEDICARE

## 2022-01-28 VITALS
BODY MASS INDEX: 33.86 KG/M2 | HEIGHT: 72 IN | WEIGHT: 250 LBS | HEART RATE: 89 BPM | SYSTOLIC BLOOD PRESSURE: 192 MMHG | RESPIRATION RATE: 16 BRPM | DIASTOLIC BLOOD PRESSURE: 109 MMHG | TEMPERATURE: 98 F | OXYGEN SATURATION: 100 %

## 2022-01-28 DIAGNOSIS — F11.20 UNCOMPLICATED OPIOID DEPENDENCE: ICD-10-CM

## 2022-01-28 DIAGNOSIS — I10 HYPERTENSION, UNSPECIFIED TYPE: Primary | ICD-10-CM

## 2022-01-28 PROCEDURE — 99284 EMERGENCY DEPT VISIT MOD MDM: CPT

## 2022-01-28 PROCEDURE — 99284 PR EMERGENCY DEPT VISIT,LEVEL IV: ICD-10-PCS | Mod: ,,, | Performed by: EMERGENCY MEDICINE

## 2022-01-28 PROCEDURE — 25000003 PHARM REV CODE 250: Performed by: EMERGENCY MEDICINE

## 2022-01-28 PROCEDURE — 99284 EMERGENCY DEPT VISIT MOD MDM: CPT | Mod: ,,, | Performed by: EMERGENCY MEDICINE

## 2022-01-28 RX ORDER — METOPROLOL SUCCINATE 50 MG/1
50 TABLET, EXTENDED RELEASE ORAL DAILY
Qty: 30 TABLET | Refills: 0 | Status: SHIPPED | OUTPATIENT
Start: 2022-01-28 | End: 2022-11-04 | Stop reason: ALTCHOICE

## 2022-01-28 RX ORDER — HYDROCHLOROTHIAZIDE 25 MG/1
25 TABLET ORAL
Status: COMPLETED | OUTPATIENT
Start: 2022-01-28 | End: 2022-01-28

## 2022-01-28 RX ORDER — HYDROCHLOROTHIAZIDE 25 MG/1
25 TABLET ORAL DAILY
Qty: 30 TABLET | Refills: 0 | Status: SHIPPED | OUTPATIENT
Start: 2022-01-28 | End: 2022-11-04

## 2022-01-28 RX ORDER — METOPROLOL SUCCINATE 50 MG/1
50 TABLET, EXTENDED RELEASE ORAL DAILY
COMMUNITY
End: 2022-01-28 | Stop reason: SDUPTHER

## 2022-01-28 RX ORDER — LISINOPRIL 40 MG/1
40 TABLET ORAL DAILY
Qty: 30 TABLET | Refills: 0 | Status: SHIPPED | OUTPATIENT
Start: 2022-01-28 | End: 2022-11-04 | Stop reason: SDUPTHER

## 2022-01-28 RX ORDER — METOPROLOL SUCCINATE 50 MG/1
50 TABLET, EXTENDED RELEASE ORAL DAILY
Status: DISCONTINUED | OUTPATIENT
Start: 2022-01-28 | End: 2022-01-28 | Stop reason: HOSPADM

## 2022-01-28 RX ORDER — LISINOPRIL 20 MG/1
40 TABLET ORAL
Status: COMPLETED | OUTPATIENT
Start: 2022-01-28 | End: 2022-01-28

## 2022-01-28 RX ORDER — AMLODIPINE BESYLATE 10 MG/1
10 TABLET ORAL DAILY
Qty: 30 TABLET | Refills: 0 | Status: SHIPPED | OUTPATIENT
Start: 2022-01-28 | End: 2022-11-04 | Stop reason: SDUPTHER

## 2022-01-28 RX ADMIN — HYDROCHLOROTHIAZIDE 25 MG: 25 TABLET ORAL at 04:01

## 2022-01-28 RX ADMIN — LISINOPRIL 40 MG: 20 TABLET ORAL at 04:01

## 2022-01-28 RX ADMIN — METOPROLOL SUCCINATE 50 MG: 50 TABLET, EXTENDED RELEASE ORAL at 04:01

## 2022-01-28 NOTE — ED PROVIDER NOTES
Encounter Date: 1/28/2022       History     Chief Complaint   Patient presents with    Hypertension     Was seeking voluntary admission at [facility] for drug detox, and HTN was noted--hx HTN, noncompliant with meds, denies any complaints     Patient is a 51-year-old male past medical history of hypertension, diabetes, opiate dependence presenting today for hypertension.  He has been noncompliant with his blood pressure medications for the past week.  States that he ran out of his medications at home.  He has also been use in heroin daily, last snorted prior to arrival at [facility].  He went for voluntary detox but was referred to the ED for severe hypertension.  He denies headache, vision change, unilateral weakness, numbness/tingling.  No chest pain or shortness of breath.          Review of patient's allergies indicates:  No Known Allergies  Past Medical History:   Diagnosis Date    Diabetes mellitus     Hypertension      Past Surgical History:   Procedure Laterality Date    CHOLECYSTECTOMY       History reviewed. No pertinent family history.  Social History     Tobacco Use    Smoking status: Current Every Day Smoker     Packs/day: 0.50     Types: Cigarettes    Smokeless tobacco: Never Used   Substance Use Topics    Alcohol use: No    Drug use: Yes     Comment: heroin and Fentanyl mixed, last use 2 hours PTA     Review of Systems   Constitutional: Negative for fever.   HENT: Negative for sore throat.    Respiratory: Negative for shortness of breath.    Cardiovascular: Negative for chest pain.   Gastrointestinal: Negative for nausea.   Genitourinary: Negative for dysuria.   Musculoskeletal: Negative for back pain.   Skin: Negative for rash.   Neurological: Negative for weakness.   Hematological: Does not bruise/bleed easily.   Psychiatric/Behavioral: Negative for self-injury and suicidal ideas.        Opiate dependence       Physical Exam     Initial Vitals [01/28/22 1529]   BP Pulse Resp Temp SpO2   (!)  181/99 77 16 97.5 °F (36.4 °C) 99 %      MAP       --         Physical Exam    Nursing note and vitals reviewed.  Constitutional: He appears well-developed and well-nourished. No distress.   HENT:   Mouth/Throat: Oropharynx is clear and moist.   Eyes: Conjunctivae are normal.   Neck: Neck supple.   Cardiovascular: Normal rate, regular rhythm and intact distal pulses.   Pulmonary/Chest: Breath sounds normal. He has no wheezes. He has no rales.   Abdominal: Abdomen is soft. He exhibits no distension. There is no abdominal tenderness. There is no rebound and no guarding.   Musculoskeletal:         General: No edema.      Cervical back: Neck supple.     Lymphadenopathy:     He has no cervical adenopathy.   Neurological: He is alert and oriented to person, place, and time.   Skin: Skin is warm. Capillary refill takes less than 2 seconds. No rash noted.   Psychiatric: He has a normal mood and affect.         ED Course   Procedures  Labs Reviewed   HIV 1 / 2 ANTIBODY   HEPATITIS C ANTIBODY          Imaging Results    None          Medications   metoprolol succinate (TOPROL-XL) 24 hr tablet 50 mg (50 mg Oral Given 1/28/22 1646)   lisinopriL tablet 40 mg (40 mg Oral Given 1/28/22 1646)   hydroCHLOROthiazide tablet 25 mg (25 mg Oral Given 1/28/22 1646)     Medical Decision Making:   History:   Old Medical Records: I decided to obtain old medical records.  Initial Assessment:   Emergent evaluation is 51-year-old male presenting today for uncontrolled high blood pressure.  Reports noncompliant with his medications.  On arrival, /99 otherwise vital signs are stable.  He denies any complaints at this time.  Differential Diagnosis:   Asymptomatic hypertension, opiate dependence  ED Management:  - home BP meds ordered.  Patient requesting to eat.  Order placed.  Will monitor BP.    - nurse contacted [facility], states that they will be able to take patient back once his blood pressure is addressed.  He does not need medical  clearance labs as he is a voluntary admission.                       Clinical Impression:   Final diagnoses:  [I10] Hypertension, unspecified type (Primary)  [F11.20] Uncomplicated opioid dependence                 Gena Morales MD  01/28/22 2003

## 2022-01-28 NOTE — ED NOTES
Spoke with [facility], requires patient to have b/p meds and rx, are holding his bed for him,not admitted yet

## 2022-01-28 NOTE — ED NOTES
Patient identifiers verified and correct for Mr Higuera  C/C: Elevated b/p SEE NN  APPEARANCE: awake and alert in NAD.  SKIN: warm, dry and intact. No breakdown or bruising.  MUSCULOSKELETAL: Patient moving all extremities spontaneously, no obvious swelling or deformities noted. Ambulates independently.  RESPIRATORY: Denies shortness of breath.Respirations unlabored.   CARDIAC: Denies CP, 2+ distal pulses; no peripheral edema  ABDOMEN: S/ND/NT, Denies nausea  : voids spontaneously, denies difficulty  Neurologic: AAO x 4; follows commands equal strength in all extremities; denies numbness/tingling. Denies dizziness denies weakness, deneis headache

## 2022-01-28 NOTE — ED NOTES
Patient to be admitted to [facility] for detox, states his b/p is elevated, supposed to be on meds x4, none x 1 week. Heroin and Fentanyl mix, last use 2 hours PTA

## 2022-01-28 NOTE — DISCHARGE INSTRUCTIONS
It is important that you take your blood pressure keep medications daily  Restart all medication  Return to [facility] for detox

## 2022-01-29 NOTE — ED NOTES
Patient states he would like to be discharged with LYTRENT to [facility], mult attempts ot contact family, physician aware of current b/p. Await rx to be signed for discharge

## 2022-01-30 ENCOUNTER — HOSPITAL ENCOUNTER (EMERGENCY)
Facility: OTHER | Age: 52
Discharge: LEFT BEFORE DISCHARGE | End: 2022-01-30
Attending: EMERGENCY MEDICINE
Payer: MEDICARE

## 2022-01-30 VITALS
OXYGEN SATURATION: 100 % | TEMPERATURE: 99 F | DIASTOLIC BLOOD PRESSURE: 110 MMHG | HEART RATE: 88 BPM | RESPIRATION RATE: 19 BRPM | SYSTOLIC BLOOD PRESSURE: 200 MMHG

## 2022-01-30 DIAGNOSIS — F11.93 OPIATE WITHDRAWAL: ICD-10-CM

## 2022-01-30 DIAGNOSIS — I10 UNCONTROLLED HYPERTENSION: Primary | ICD-10-CM

## 2022-01-30 DIAGNOSIS — R11.2 NAUSEA AND VOMITING, INTRACTABILITY OF VOMITING NOT SPECIFIED, UNSPECIFIED VOMITING TYPE: ICD-10-CM

## 2022-01-30 LAB
ALBUMIN SERPL BCP-MCNC: 3.9 G/DL (ref 3.5–5.2)
ALP SERPL-CCNC: 98 U/L (ref 55–135)
ALT SERPL W/O P-5'-P-CCNC: 26 U/L (ref 10–44)
ANION GAP SERPL CALC-SCNC: 16 MMOL/L (ref 8–16)
AST SERPL-CCNC: 27 U/L (ref 10–40)
BACTERIA #/AREA URNS HPF: ABNORMAL /HPF
BASOPHILS # BLD AUTO: 0.03 K/UL (ref 0–0.2)
BASOPHILS NFR BLD: 0.3 % (ref 0–1.9)
BILIRUB SERPL-MCNC: 0.6 MG/DL (ref 0.1–1)
BILIRUB UR QL STRIP: NEGATIVE
BUN SERPL-MCNC: 13 MG/DL (ref 6–20)
CALCIUM SERPL-MCNC: 10.2 MG/DL (ref 8.7–10.5)
CHLORIDE SERPL-SCNC: 99 MMOL/L (ref 95–110)
CLARITY UR: ABNORMAL
CO2 SERPL-SCNC: 20 MMOL/L (ref 23–29)
COLOR UR: YELLOW
CREAT SERPL-MCNC: 1 MG/DL (ref 0.5–1.4)
DIFFERENTIAL METHOD: ABNORMAL
EOSINOPHIL # BLD AUTO: 0 K/UL (ref 0–0.5)
EOSINOPHIL NFR BLD: 0 % (ref 0–8)
ERYTHROCYTE [DISTWIDTH] IN BLOOD BY AUTOMATED COUNT: 14 % (ref 11.5–14.5)
EST. GFR  (AFRICAN AMERICAN): >60 ML/MIN/1.73 M^2
EST. GFR  (NON AFRICAN AMERICAN): >60 ML/MIN/1.73 M^2
GLUCOSE SERPL-MCNC: 193 MG/DL (ref 70–110)
GLUCOSE UR QL STRIP: NEGATIVE
HCT VFR BLD AUTO: 37.8 % (ref 40–54)
HGB BLD-MCNC: 12 G/DL (ref 14–18)
HGB UR QL STRIP: ABNORMAL
HYALINE CASTS #/AREA URNS LPF: 0 /LPF
IMM GRANULOCYTES # BLD AUTO: 0.03 K/UL (ref 0–0.04)
IMM GRANULOCYTES NFR BLD AUTO: 0.3 % (ref 0–0.5)
KETONES UR QL STRIP: ABNORMAL
LEUKOCYTE ESTERASE UR QL STRIP: NEGATIVE
LYMPHOCYTES # BLD AUTO: 0.9 K/UL (ref 1–4.8)
LYMPHOCYTES NFR BLD: 7.9 % (ref 18–48)
MCH RBC QN AUTO: 26.1 PG (ref 27–31)
MCHC RBC AUTO-ENTMCNC: 31.7 G/DL (ref 32–36)
MCV RBC AUTO: 82 FL (ref 82–98)
MICROSCOPIC COMMENT: ABNORMAL
MONOCYTES # BLD AUTO: 0.2 K/UL (ref 0.3–1)
MONOCYTES NFR BLD: 1.5 % (ref 4–15)
NEUTROPHILS # BLD AUTO: 10 K/UL (ref 1.8–7.7)
NEUTROPHILS NFR BLD: 90 % (ref 38–73)
NITRITE UR QL STRIP: NEGATIVE
NRBC BLD-RTO: 0 /100 WBC
PH UR STRIP: 6 [PH] (ref 5–8)
PLATELET # BLD AUTO: 266 K/UL (ref 150–450)
PMV BLD AUTO: 11.2 FL (ref 9.2–12.9)
POTASSIUM SERPL-SCNC: 4.8 MMOL/L (ref 3.5–5.1)
PROT SERPL-MCNC: 10.3 G/DL (ref 6–8.4)
PROT UR QL STRIP: ABNORMAL
RBC # BLD AUTO: 4.6 M/UL (ref 4.6–6.2)
RBC #/AREA URNS HPF: 31 /HPF (ref 0–4)
SODIUM SERPL-SCNC: 135 MMOL/L (ref 136–145)
SP GR UR STRIP: >=1.03 (ref 1–1.03)
SQUAMOUS #/AREA URNS HPF: 24 /HPF
URN SPEC COLLECT METH UR: ABNORMAL
UROBILINOGEN UR STRIP-ACNC: 1 EU/DL
WBC # BLD AUTO: 11.08 K/UL (ref 3.9–12.7)
WBC #/AREA URNS HPF: 0 /HPF (ref 0–5)

## 2022-01-30 PROCEDURE — 25000003 PHARM REV CODE 250: Performed by: EMERGENCY MEDICINE

## 2022-01-30 PROCEDURE — 85025 COMPLETE CBC W/AUTO DIFF WBC: CPT | Performed by: EMERGENCY MEDICINE

## 2022-01-30 PROCEDURE — 81000 URINALYSIS NONAUTO W/SCOPE: CPT | Performed by: EMERGENCY MEDICINE

## 2022-01-30 PROCEDURE — 80053 COMPREHEN METABOLIC PANEL: CPT | Performed by: EMERGENCY MEDICINE

## 2022-01-30 PROCEDURE — 99284 EMERGENCY DEPT VISIT MOD MDM: CPT | Mod: 25

## 2022-01-30 RX ORDER — CLONIDINE 0.2 MG/24H
1 PATCH, EXTENDED RELEASE TRANSDERMAL
Status: DISCONTINUED | OUTPATIENT
Start: 2022-01-30 | End: 2022-01-30 | Stop reason: HOSPADM

## 2022-01-30 RX ORDER — DICYCLOMINE HYDROCHLORIDE 10 MG/ML
20 INJECTION INTRAMUSCULAR
Status: DISCONTINUED | OUTPATIENT
Start: 2022-01-30 | End: 2022-01-30 | Stop reason: HOSPADM

## 2022-01-30 RX ORDER — ONDANSETRON 2 MG/ML
4 INJECTION INTRAMUSCULAR; INTRAVENOUS
Status: DISCONTINUED | OUTPATIENT
Start: 2022-01-30 | End: 2022-01-30 | Stop reason: HOSPADM

## 2022-01-30 RX ORDER — LABETALOL HYDROCHLORIDE 5 MG/ML
10 INJECTION, SOLUTION INTRAVENOUS
Status: DISCONTINUED | OUTPATIENT
Start: 2022-01-30 | End: 2022-01-30 | Stop reason: HOSPADM

## 2022-01-30 RX ADMIN — SODIUM CHLORIDE 1000 ML: 0.9 INJECTION, SOLUTION INTRAVENOUS at 11:01

## 2022-01-30 RX ADMIN — CLONIDINE 1 PATCH: 0.2 PATCH TRANSDERMAL at 11:01

## 2022-01-30 NOTE — ED TRIAGE NOTES
"pt brought in By EMS for HTN. Pt at [facility] since 1/28/2022 for opiate with withdraw. Last subutex 0100 today. Pt on hydrochlorothiazide 25 twice a day, lisinopril 40 daily , metoprolol 50 daily starting 1/29/2022. No arm drift noted. + shaking hands/arms. abd cramping. Pt states he feels " dope sick". 2+ bilat radial pulses, 2+ bilat pedal pulses. Last used heroin 1-2 days ago.   "

## 2022-01-30 NOTE — ED PROVIDER NOTES
Encounter Date: 1/30/2022    SCRIBE #1 NOTE: I, Kaylah Dylon, am scribing for, and in the presence of, Yvette Pelaez MD.       History     Chief Complaint   Patient presents with    Hypertension     Opiate withdraw/been at [facility] since 1/28/2022     Chet Rudd is a 51 y.o. male, with a PMHx of opiate use, who presents to the ED with abdominal pain beginning 2 days ago. The patient reports that he has been at [facility] since 1/28/2022 when he began experiencing opiate withdrawals. He reports a loss of appetite, nausea, vomiting, diarrhea, fever, chills, headache, rhinorrhea, saliva increase, and dysuria but denies cough. He has a past medical history of HTN and diabetes and reports taking insulin and metformin to manage his diabetes. He endorses cigarette use and drug use. No other exacerbating or alleviating factors. No other associated symptoms. This is the extent of the patient's complaints today in the Emergency Department.     The history is provided by the patient.     Review of patient's allergies indicates:  No Known Allergies  Past Medical History:   Diagnosis Date    Diabetes mellitus     Hypertension      Past Surgical History:   Procedure Laterality Date    CHOLECYSTECTOMY       No family history on file.  Social History     Tobacco Use    Smoking status: Current Every Day Smoker     Packs/day: 0.50     Types: Cigarettes    Smokeless tobacco: Never Used   Substance Use Topics    Alcohol use: No    Drug use: Yes     Comment: heroin and Fentanyl mixed, last use 2 hours PTA     Review of Systems   Constitutional: Positive for appetite change, chills and fever.   HENT: Positive for rhinorrhea. Negative for congestion and sore throat.         Notes increase in saliva production.   Eyes: Negative for visual disturbance.   Respiratory: Negative for cough and shortness of breath.    Cardiovascular: Negative for chest pain and palpitations.   Gastrointestinal: Positive  for diarrhea, nausea and vomiting. Negative for abdominal pain.   Genitourinary: Positive for dysuria. Negative for decreased urine volume and frequency.   Musculoskeletal: Negative for joint swelling, neck pain and neck stiffness.   Skin: Negative for rash and wound.   Neurological: Negative for weakness, numbness and headaches.   Psychiatric/Behavioral: Negative for behavioral problems and confusion.       Physical Exam     Initial Vitals   BP Pulse Resp Temp SpO2   01/30/22 1044 01/30/22 1039 01/30/22 1039 01/30/22 1039 01/30/22 1039   (!) 219/105 89 (!) 22 99.2 °F (37.3 °C) 99 %      MAP       --                Vitals:    01/30/22 1039 01/30/22 1044 01/30/22 1045 01/30/22 1050   BP:  (!) 219/105 (!) 190/120 (!) 200/110   Pulse: 89 93 88    Resp: (!) 22 (!) 26 19    Temp: 99.2 °F (37.3 °C)      SpO2: 99% 99% 100%      Physical Exam    Nursing note and vitals reviewed.  Constitutional: He appears well-developed and well-nourished. He appears distressed.   HENT:   Head: Normocephalic and atraumatic.   Eyes: EOM are normal.   Neck:   Normal range of motion.  Cardiovascular: Normal rate and regular rhythm. Exam reveals no gallop and no friction rub.    No murmur heard.  Pulmonary/Chest: Breath sounds normal. No respiratory distress. He has no wheezes. He has no rales.   Abdominal: Abdomen is soft. Bowel sounds are normal. There is no abdominal tenderness. There is no rebound and no guarding.   Musculoskeletal:         General: No tenderness or edema.      Cervical back: Normal range of motion.     Neurological: He is alert and oriented to person, place, and time. Gait normal. GCS eye subscore is 4. GCS verbal subscore is 5. GCS motor subscore is 6.   Skin: Skin is warm and dry. No rash noted.   Psychiatric: His speech is normal.         ED Course   Procedures  Labs Reviewed   CBC W/ AUTO DIFFERENTIAL - Abnormal; Notable for the following components:       Result Value    Hemoglobin 12.0 (*)     Hematocrit 37.8 (*)      MCH 26.1 (*)     MCHC 31.7 (*)     Gran # (ANC) 10.0 (*)     Lymph # 0.9 (*)     Mono # 0.2 (*)     Gran % 90.0 (*)     Lymph % 7.9 (*)     Mono % 1.5 (*)     All other components within normal limits   COMPREHENSIVE METABOLIC PANEL - Abnormal; Notable for the following components:    Sodium 135 (*)     CO2 20 (*)     Glucose 193 (*)     Total Protein 10.3 (*)     All other components within normal limits   URINALYSIS, REFLEX TO URINE CULTURE - Abnormal; Notable for the following components:    Appearance, UA Hazy (*)     Specific Gravity, UA >=1.030 (*)     Protein, UA 1+ (*)     Ketones, UA 2+ (*)     Occult Blood UA Trace (*)     All other components within normal limits    Narrative:     Specimen Source->Urine   URINALYSIS MICROSCOPIC - Abnormal; Notable for the following components:    RBC, UA 31 (*)     All other components within normal limits    Narrative:     Specimen Source->Urine          Imaging Results    None          Medications   sodium chloride 0.9% bolus 1,000 mL (1,000 mLs Intravenous New Bag 1/30/22 1123)     Medical Decision Making:   History:   Old Medical Records: I decided to obtain old medical records.  Clinical Tests:   Lab Tests: Ordered and Reviewed  ED Management:  Emergent evaluation a 51-year-old male currently in an opiate rehab facility for detoxification presents with complaint of detox symptoms.  He states he was unable to keep down his blood pressure medicine this morning and vomited it up.  He reports abdominal cramping and general ill feeling.  Vital signs reveal significant hypertension, afebrile.  He was treated with IV fluids, orders placed for clonidine, Bentyl, Zofran.  Patient went to the restroom and got dressed.  He stated he wished to leave and wanted to go out and do some dope.  He was encouraged to stay both by myself and staff from the rehab facility who had accompanied him to the ED.  However, he has a grown man in under voluntary admission there, and was  "allowed to leave.  He was not currently intoxicated and had decision-making capacity.  He was discharged in good condition but encouraged to stay sober, seek rehab when he was ready, and to return to the ED at any time.          Scribe Attestation:   Scribe #1: I performed the above scribed service and the documentation accurately describes the services I performed. I attest to the accuracy of the note.        ED Course as of 02/01/22 1327   Sun Jan 30, 2022   1142 Patient went to the bathroom and change his clothes without our knowledge.  He came out and demanded to leave the emergency room.  He was encouraged to seek day, but he states he "would rather die and I need some dope."IV was removed and he was allowed to leave.  He was encouraged to return if he changes his mind or for any other reason. [AK]      ED Course User Index  [AK] Yvette Pelaez MD           Physician Attestation for Scribe: I, Yvette Pelaez, reviewed documentation as scribed in my presence, which is both accurate and complete.    Clinical Impression:   Final diagnoses:  [I10] Uncontrolled hypertension (Primary)  [F11.23] Opiate withdrawal  [R11.2] Nausea and vomiting, intractability of vomiting not specified, unspecified vomiting type          ED Disposition Condition    Discharge Stable        ED Prescriptions     None        Follow-up Information     Follow up With Specialties Details Why Contact Info    Keren Jose MD Internal Medicine Schedule an appointment as soon as possible for a visit   [address] [facility] [address]  [phone]      [facility] - Emergency Dept Emergency Medicine  As needed, If symptoms worsen [address] [facility] [id number] [phone]           Yvette Pelaez MD  02/01/22 1321    "

## 2022-01-30 NOTE — ED NOTES
Bed: 03  Expected date:   Expected time:   Means of arrival:   Comments:  EMS 51 M - abdominal pain / 223/129 BP  [facility] not [facility] - detox

## 2022-01-30 NOTE — ED NOTES
Pt left AMA. Iv was removed by other staff in ED. [facility] sitter that was with him noitified [facility]

## 2022-11-04 RX ORDER — OXYCODONE AND ACETAMINOPHEN 10; 325 MG/1; MG/1
1 TABLET ORAL EVERY 4 HOURS PRN
Qty: 42 TABLET | Refills: 0 | Status: SHIPPED | OUTPATIENT
Start: 2022-11-04

## 2022-11-04 RX ORDER — GABAPENTIN 800 MG/1
800 TABLET ORAL 3 TIMES DAILY
Qty: 90 TABLET | Refills: 0 | Status: SHIPPED | OUTPATIENT
Start: 2022-11-04 | End: 2023-11-04

## 2022-11-04 RX ORDER — CARVEDILOL 12.5 MG/1
12.5 TABLET ORAL 2 TIMES DAILY WITH MEALS
Qty: 60 TABLET | Refills: 0 | Status: SHIPPED | OUTPATIENT
Start: 2022-11-04 | End: 2023-11-04

## 2022-11-04 RX ORDER — AMITRIPTYLINE HYDROCHLORIDE 10 MG/1
10 TABLET, FILM COATED ORAL NIGHTLY
Qty: 30 TABLET | Refills: 0 | Status: SHIPPED | OUTPATIENT
Start: 2022-11-04 | End: 2023-11-04

## 2022-11-04 RX ORDER — AMLODIPINE BESYLATE 10 MG/1
10 TABLET ORAL DAILY
Qty: 30 TABLET | Refills: 0 | OUTPATIENT
Start: 2022-11-04 | End: 2023-11-04

## 2022-11-04 RX ORDER — IBUPROFEN 200 MG
1 TABLET ORAL DAILY
Qty: 30 PATCH | Refills: 0 | Status: SHIPPED | OUTPATIENT
Start: 2022-11-04

## 2022-11-04 RX ORDER — LISINOPRIL 40 MG/1
40 TABLET ORAL DAILY
Qty: 30 TABLET | Refills: 0 | Status: SHIPPED | OUTPATIENT
Start: 2022-11-04

## 2022-11-04 RX ORDER — METFORMIN HYDROCHLORIDE 500 MG/1
500 TABLET ORAL EVERY MORNING
Qty: 30 TABLET | Refills: 0 | Status: SHIPPED | OUTPATIENT
Start: 2022-11-04

## 2022-11-04 RX ORDER — HYDROCHLOROTHIAZIDE 25 MG/1
12.5 TABLET ORAL DAILY
Qty: 15 TABLET | Refills: 0 | Status: SHIPPED | OUTPATIENT
Start: 2022-11-04

## 2022-12-29 ENCOUNTER — HOSPITAL ENCOUNTER (EMERGENCY)
Facility: HOSPITAL | Age: 52
Discharge: LEFT BEFORE DISCHARGE | End: 2022-12-29
Attending: EMERGENCY MEDICINE
Payer: COMMERCIAL

## 2022-12-29 VITALS
SYSTOLIC BLOOD PRESSURE: 219 MMHG | DIASTOLIC BLOOD PRESSURE: 124 MMHG | HEART RATE: 80 BPM | RESPIRATION RATE: 20 BRPM | OXYGEN SATURATION: 99 %

## 2022-12-29 DIAGNOSIS — R11.10 VOMITING, UNSPECIFIED VOMITING TYPE, UNSPECIFIED WHETHER NAUSEA PRESENT: ICD-10-CM

## 2022-12-29 DIAGNOSIS — M54.9 BACK PAIN, UNSPECIFIED BACK LOCATION, UNSPECIFIED BACK PAIN LATERALITY, UNSPECIFIED CHRONICITY: Primary | ICD-10-CM

## 2022-12-29 PROCEDURE — 99284 EMERGENCY DEPT VISIT MOD MDM: CPT | Mod: ,,, | Performed by: EMERGENCY MEDICINE

## 2022-12-29 PROCEDURE — 99283 EMERGENCY DEPT VISIT LOW MDM: CPT

## 2022-12-29 PROCEDURE — 99284 PR EMERGENCY DEPT VISIT,LEVEL IV: ICD-10-PCS | Mod: ,,, | Performed by: EMERGENCY MEDICINE

## 2022-12-29 NOTE — ED PROVIDER NOTES
Encounter Date: 12/29/2022       History     Chief Complaint   Patient presents with    Fall     Pt is coming from [facility] for detox off methadone, opiates, and ETOH--unwitnessed fall, pt reports hitting the back of his head and his back on the toilet after feeling weak, unknown LOC, denies neck pain; ems reports pt vomited twice; pt is agitated and uncooperative     HPI  52-year-old male with past medical history as noted below coming in from [facility] where he is there for ethanol, opiates, methadone detox.  He reportedly had a witnessed fall at [facility] hitting the back of his head and head.  Came in for evaluation.  Reportedly vomited twice.    Upon my evaluation in the ED he is very angry, he says he is having pain in his specifically asking for Dilaudid.  He did give me a good history.  He said he slipped when getting up from the toilet on floor which was wet causing him to fall back onto his back.  He is complaining mainly of back pain.  During my interview is not complaining of headache.  States he has been having some abdominal discomfort and vomited twice as well.  Patient is not providing significant further history and persistently asked for Dilaudid.  I explained to him that this is not a first-line pain medication and I am happy to treat his pain but that we would try other medications 1st.    Review of patient's allergies indicates:  No Known Allergies  Past Medical History:   Diagnosis Date    Diabetes mellitus     Hypertension      Past Surgical History:   Procedure Laterality Date    CHOLECYSTECTOMY       No family history on file.  Social History     Tobacco Use    Smoking status: Every Day     Packs/day: 0.50     Types: Cigarettes    Smokeless tobacco: Never   Substance Use Topics    Alcohol use: No    Drug use: Yes     Comment: heroin and Fentanyl mixed, last use 2 hours PTA     Review of Systems    Refusing to undertake in ROS    Physical Exam     Initial Vitals [12/29/22 1300]   BP Pulse  Resp Temp SpO2   (!) 219/124 80 20 -- 99 %      MAP       --         Physical Exam    Physical Exam:  CONSTITUTIONAL: Well developed, well nourished, in no acute distress.  HENT: Normocephalic, atraumatic    EYES: Sclerae anicteric, pupils equal round.  NECK: Supple, no thyroid enlargement  CARDIOVASCULAR: Regular rate and rhythm without any murmurs, gallops, rubs.  RESPIRATORY: Speaking in full sentences. Breathing comfortably. Auscultation of the lungs revealed normal breath sounds b/l, no wheezing, no rales, no rhonchi.  ABDOMEN: Soft and nontender, no masses, no rebound or guarding   NEUROLOGIC: Alert, interacting normally. No facial droop.  Moving all extremities well, normal gait.  MSK:  There is T and L-spine tenderness to palpation with no step-offs.  Moving all four extremities.  Skin: Warm and dry. No visible rash on exposed areas of skin.    Psych:  Irritable and uncooperative.      ED Course   Procedures  Labs Reviewed - No data to display         Imaging Results    None          Medications - No data to display  Medical Decision Making:   History:   I obtained history from: someone other than patient.       <> Summary of History: Part of history obtained from [facility] personnel.  Old Medical Records: I decided to obtain old medical records.  Old Records Summarized: records from clinic visits.       <> Summary of Records: Several visits for overdose in the past.     52-year-old male with past medical history as noted coming in with back pain, abdominal discomfort, vomiting in the setting of what appears to be a mechanical fall while at [facility] for detox.    On exam his head is atraumatic, he does have some back tenderness with no C-spine tenderness and no significant signs of head trauma.  Abdomen is entirely benign.  Lung and heart exams are benign.  Patient is ambulatory and has a nonfocal neurologic exam.    Patient is very irritable and repeatedly asking for Dilaudid.  I explained to him that  this would not be a first-line medication that we would give him in the emergency department.  That we would treat him with other pain medications.  That if he wanted to be evaluated the plan would be to obtain some labs and imaging and treat his pain, but not with Dilaudid initially.  Patient states that Dilaudid is the only medication that works for him.  When I explained that we would not start with Dilaudid as a pain regiment he decided he did not want to be evaluated, and walked out of the emergency department.     Patient is not altered, answering questions appropriately, has not express any desire to hurt himself or others, is not psychotic, this time there is no indication to hold patient against his will.  He has capacity to refuse care.  Does not meet pec criteria.    Patient is free to return to the emergency department to seek care if he desires.    Patient did not state signed AMA paperwork or undergo AMA conversation.  Marked as eloped                         Clinical Impression:   Final diagnoses:  [M54.9] Back pain, unspecified back location, unspecified back pain laterality, unspecified chronicity (Primary)  [R11.10] Vomiting, unspecified vomiting type, unspecified whether nausea present        ED Disposition Condition    Eloped                 Canelo Linton MD  12/29/22 [id number]       Canelo Linton MD  12/29/22 0234

## 2022-12-29 NOTE — ED TRIAGE NOTES
Chet Rudd, a 52 y.o. male presents to the ED via EMS from [facility] with CC fall, reports slip and fall, denies head injury or LOC, constant N/V since fall. C/o abdominal and back pain.    States is at [facility] for detox, denies SI/ HI/ hallucinations.

## 2022-12-29 NOTE — ED NOTES
"Pt yelling, "Are you going to give me an opiate shot or what?!" Pt redirected to sit down. Security at bedside.  "